# Patient Record
Sex: MALE | Race: WHITE | NOT HISPANIC OR LATINO | Employment: FULL TIME | URBAN - METROPOLITAN AREA
[De-identification: names, ages, dates, MRNs, and addresses within clinical notes are randomized per-mention and may not be internally consistent; named-entity substitution may affect disease eponyms.]

---

## 2021-02-10 PROBLEM — K51.90 ULCERATIVE COLITIS (HCC): Status: ACTIVE | Noted: 2021-02-10

## 2022-05-31 ENCOUNTER — APPOINTMENT (EMERGENCY)
Dept: RADIOLOGY | Facility: HOSPITAL | Age: 42
End: 2022-05-31
Payer: COMMERCIAL

## 2022-05-31 ENCOUNTER — HOSPITAL ENCOUNTER (EMERGENCY)
Facility: HOSPITAL | Age: 42
Discharge: HOME | End: 2022-05-31
Attending: EMERGENCY MEDICINE
Payer: COMMERCIAL

## 2022-05-31 VITALS
TEMPERATURE: 98.7 F | HEART RATE: 82 BPM | RESPIRATION RATE: 18 BRPM | BODY MASS INDEX: 35.72 KG/M2 | HEIGHT: 72 IN | OXYGEN SATURATION: 96 % | SYSTOLIC BLOOD PRESSURE: 129 MMHG | DIASTOLIC BLOOD PRESSURE: 75 MMHG

## 2022-05-31 DIAGNOSIS — U07.1 COVID-19: Primary | ICD-10-CM

## 2022-05-31 LAB
ALBUMIN SERPL BCP-MCNC: 4 G/DL (ref 3.5–5)
ALP SERPL-CCNC: 48 U/L (ref 34–104)
ALT SERPL W P-5'-P-CCNC: 46 U/L (ref 7–52)
ANION GAP SERPL CALCULATED.3IONS-SCNC: 6 MMOL/L (ref 4–13)
ANISOCYTOSIS BLD QL SMEAR: PRESENT
AST SERPL W P-5'-P-CCNC: 28 U/L (ref 13–39)
BASOPHILS # BLD MANUAL: 0 THOUSAND/UL (ref 0–0.1)
BASOPHILS NFR MAR MANUAL: 0 % (ref 0–1)
BILIRUB SERPL-MCNC: 0.26 MG/DL (ref 0.2–1)
BUN SERPL-MCNC: 13 MG/DL (ref 5–25)
CALCIUM SERPL-MCNC: 9.4 MG/DL (ref 8.4–10.2)
CHLORIDE SERPL-SCNC: 104 MMOL/L (ref 96–108)
CK SERPL-CCNC: 124 U/L (ref 39–308)
CO2 SERPL-SCNC: 25 MMOL/L (ref 21–32)
CREAT SERPL-MCNC: 0.86 MG/DL (ref 0.6–1.3)
EOSINOPHIL # BLD MANUAL: 0.16 THOUSAND/UL (ref 0–0.4)
EOSINOPHIL NFR BLD MANUAL: 2 % (ref 0–6)
ERYTHROCYTE [DISTWIDTH] IN BLOOD BY AUTOMATED COUNT: 13.3 % (ref 11.6–15.1)
FLUAV RNA RESP QL NAA+PROBE: NEGATIVE
FLUBV RNA RESP QL NAA+PROBE: NEGATIVE
GFR SERPL CREATININE-BSD FRML MDRD: 107 ML/MIN/1.73SQ M
GLUCOSE SERPL-MCNC: 109 MG/DL (ref 65–140)
HCT VFR BLD AUTO: 43 % (ref 36.5–49.3)
HGB BLD-MCNC: 14.7 G/DL (ref 12–17)
LYMPHOCYTES # BLD AUTO: 1.17 THOUSAND/UL (ref 0.6–4.47)
LYMPHOCYTES # BLD AUTO: 15 % (ref 14–44)
MAGNESIUM SERPL-MCNC: 1.9 MG/DL (ref 1.9–2.7)
MCH RBC QN AUTO: 29.4 PG (ref 26.8–34.3)
MCHC RBC AUTO-ENTMCNC: 34.2 G/DL (ref 31.4–37.4)
MCV RBC AUTO: 86 FL (ref 82–98)
MONOCYTES # BLD AUTO: 0.62 THOUSAND/UL (ref 0–1.22)
MONOCYTES NFR BLD: 8 % (ref 4–12)
NEUTROPHILS # BLD MANUAL: 5.83 THOUSAND/UL (ref 1.85–7.62)
NEUTS BAND NFR BLD MANUAL: 3 % (ref 0–8)
NEUTS SEG NFR BLD AUTO: 72 % (ref 43–75)
PLATELET # BLD AUTO: 217 THOUSANDS/UL (ref 149–390)
PLATELET BLD QL SMEAR: ADEQUATE
PMV BLD AUTO: 10.1 FL (ref 8.9–12.7)
POTASSIUM SERPL-SCNC: 4.1 MMOL/L (ref 3.5–5.3)
PROT SERPL-MCNC: 6.4 G/DL (ref 6.4–8.4)
RBC # BLD AUTO: 5 MILLION/UL (ref 3.88–5.62)
RSV RNA RESP QL NAA+PROBE: NEGATIVE
SARS-COV-2 RNA RESP QL NAA+PROBE: POSITIVE
SODIUM SERPL-SCNC: 135 MMOL/L (ref 135–147)
WBC # BLD AUTO: 7.77 THOUSAND/UL (ref 4.31–10.16)

## 2022-05-31 PROCEDURE — 99285 EMERGENCY DEPT VISIT HI MDM: CPT | Performed by: EMERGENCY MEDICINE

## 2022-05-31 PROCEDURE — 96361 HYDRATE IV INFUSION ADD-ON: CPT

## 2022-05-31 PROCEDURE — 0241U HB NFCT DS VIR RESP RNA 4 TRGT: CPT

## 2022-05-31 PROCEDURE — 86618 LYME DISEASE ANTIBODY: CPT

## 2022-05-31 PROCEDURE — 85027 COMPLETE CBC AUTOMATED: CPT

## 2022-05-31 PROCEDURE — 85007 BL SMEAR W/DIFF WBC COUNT: CPT

## 2022-05-31 PROCEDURE — 71045 X-RAY EXAM CHEST 1 VIEW: CPT

## 2022-05-31 PROCEDURE — 82550 ASSAY OF CK (CPK): CPT

## 2022-05-31 PROCEDURE — 80053 COMPREHEN METABOLIC PANEL: CPT

## 2022-05-31 PROCEDURE — 83735 ASSAY OF MAGNESIUM: CPT

## 2022-05-31 PROCEDURE — 99284 EMERGENCY DEPT VISIT MOD MDM: CPT

## 2022-05-31 PROCEDURE — 36415 COLL VENOUS BLD VENIPUNCTURE: CPT

## 2022-05-31 PROCEDURE — 96374 THER/PROPH/DIAG INJ IV PUSH: CPT

## 2022-05-31 RX ORDER — IBUPROFEN 400 MG/1
800 TABLET ORAL ONCE
Status: COMPLETED | OUTPATIENT
Start: 2022-05-31 | End: 2022-05-31

## 2022-05-31 RX ORDER — KETOROLAC TROMETHAMINE 30 MG/ML
15 INJECTION, SOLUTION INTRAMUSCULAR; INTRAVENOUS ONCE
Status: COMPLETED | OUTPATIENT
Start: 2022-05-31 | End: 2022-05-31

## 2022-05-31 RX ORDER — ACETAMINOPHEN 325 MG/1
975 TABLET ORAL ONCE
Status: COMPLETED | OUTPATIENT
Start: 2022-05-31 | End: 2022-05-31

## 2022-05-31 RX ADMIN — KETOROLAC TROMETHAMINE 15 MG: 30 INJECTION, SOLUTION INTRAMUSCULAR at 03:17

## 2022-05-31 RX ADMIN — IBUPROFEN 800 MG: 400 TABLET, FILM COATED ORAL at 01:53

## 2022-05-31 RX ADMIN — SODIUM CHLORIDE 1000 ML: 0.9 INJECTION, SOLUTION INTRAVENOUS at 01:59

## 2022-05-31 RX ADMIN — ACETAMINOPHEN 975 MG: 325 TABLET ORAL at 01:52

## 2022-05-31 NOTE — Clinical Note
accompanied Jessica Aparicio to the emergency department on 5/31/2022  Return date if applicable: 22/48/7261        If you have any questions or concerns, please don't hesitate to call        Ai Armendariz MD

## 2022-05-31 NOTE — Clinical Note
Marjan Hyman was seen and treated in our emergency department on 5/31/2022  Diagnosis:     Suzie Ngo  may return to work on return date  He may return on this date: 06/06/2022         If you have any questions or concerns, please don't hesitate to call        Irma Marquez MD    ______________________________           _______________          _______________  Hospital Representative                              Date                                Time

## 2022-05-31 NOTE — ED PROVIDER NOTES
History  Chief Complaint   Patient presents with    Flu Symptoms     Pt to ED with c/o cough, congestion, body aches, and "can't get comfortable"     Kiarra Vivar is a 39year old male with PMHx of HTN presenting to the ED due to flu-like symptoms  Patient states 1 week ago, he had congestion, fatigue  On 05/26, patient called his family doctor for an appointment, doc started him on z-pac which patient was initially helped  However, this morning patient's symptoms worsened, now has cough, congestion, orthostatic dizziness, fatigue, myalgias  Patient took Motrin and Tylenol which have not helped the symptoms  Vaccinated x 2 for covid, no booster  Not vaccinated for flu  Patient does note he recently came back from the [facility] on 5/24, denies rash or tick bite  Denies chest pain, shortness of breath, abdominal pain, urinary symptoms, n/v/d  Prior to Admission Medications   Prescriptions Last Dose Informant Patient Reported? Taking? amphetamine-dextroamphetamine (ADDERALL) 30 MG tablet   Yes No   Sig: Take 1 tablet by mouth daily   ezetimibe-simvastatin (VYTORIN) 10-20 mg per tablet   Yes No   Sig: Take 1 tablet by mouth daily at bedtime   mesalamine (LIALDA) 1 2 g EC tablet   No No   Sig: Take 4 tablets (4 8 g total) by mouth daily with breakfast   quinapril (ACCUPRIL) 20 mg tablet   Yes No   Sig: Take 20 mg by mouth daily      Facility-Administered Medications: None       Past Medical History:   Diagnosis Date    ADHD     Hyperlipidemia     Hypertension        History reviewed  No pertinent surgical history  Family History   Problem Relation Age of Onset    Lung cancer Maternal Grandmother     Melanoma Maternal Grandfather      I have reviewed and agree with the history as documented      E-Cigarette/Vaping     E-Cigarette/Vaping Substances     Social History     Tobacco Use    Smoking status: Current Every Day Smoker     Packs/day: 0 50     Types: Cigars    Smokeless tobacco: Never Used  Tobacco comment: once a week   Substance Use Topics    Alcohol use: Yes     Comment: very rare        Review of Systems   Constitutional: Positive for appetite change and fatigue  Negative for chills and fever  HENT: Positive for congestion  Negative for ear pain and sore throat  Eyes: Negative for pain and visual disturbance  Respiratory: Positive for cough  Negative for shortness of breath  Cardiovascular: Negative for chest pain and palpitations  Gastrointestinal: Negative for abdominal pain and vomiting  Genitourinary: Negative for dysuria and hematuria  Musculoskeletal: Positive for arthralgias and myalgias  Negative for back pain  Skin: Negative for color change and rash  Neurological: Positive for dizziness  Negative for seizures and syncope  All other systems reviewed and are negative  Physical Exam  ED Triage Vitals [05/31/22 0034]   Temperature Pulse Respirations Blood Pressure SpO2   98 7 °F (37 1 °C) 84 18 139/79 95 %      Temp Source Heart Rate Source Patient Position - Orthostatic VS BP Location FiO2 (%)   Oral -- -- -- --      Pain Score       9             Orthostatic Vital Signs  Vitals:    05/31/22 0034 05/31/22 0315   BP: 139/79 129/75   Pulse: 84 82       Physical Exam  Vitals and nursing note reviewed  Constitutional:       Appearance: He is well-developed  HENT:      Head: Normocephalic and atraumatic  Mouth/Throat:      Mouth: Mucous membranes are dry  Eyes:      Conjunctiva/sclera: Conjunctivae normal    Neck:      Vascular: No JVD  Trachea: Trachea and phonation normal    Cardiovascular:      Rate and Rhythm: Normal rate and regular rhythm  Pulses: Normal pulses  Radial pulses are 2+ on the right side and 2+ on the left side  Dorsalis pedis pulses are 2+ on the right side and 2+ on the left side  Heart sounds: Normal heart sounds  No murmur heard    Pulmonary:      Effort: Pulmonary effort is normal  No respiratory distress  Breath sounds: Normal breath sounds and air entry  Abdominal:      General: Bowel sounds are normal       Palpations: Abdomen is soft  Tenderness: There is no abdominal tenderness  Comments: Soft nontender, nonperitoneal   Musculoskeletal:      Cervical back: Full passive range of motion without pain, normal range of motion and neck supple  Right lower leg: No edema  Left lower leg: No edema  Skin:     General: Skin is warm and dry  Capillary Refill: Capillary refill takes less than 2 seconds  Neurological:      General: No focal deficit present  Mental Status: He is alert           ED Medications  Medications   sodium chloride 0 9 % bolus 1,000 mL (0 mL Intravenous Stopped 5/31/22 0317)   acetaminophen (TYLENOL) tablet 975 mg (975 mg Oral Given 5/31/22 0152)   ibuprofen (MOTRIN) tablet 800 mg (800 mg Oral Given 5/31/22 0153)   ketorolac (TORADOL) injection 15 mg (15 mg Intravenous Given 5/31/22 0317)       Diagnostic Studies  Results Reviewed     Procedure Component Value Units Date/Time    Manual Differential(PHLEBS Do Not Order) [[id number]] Collected: 05/31/22 0201    Lab Status: Final result Specimen: Blood from Arm, Right Updated: 05/31/22 0326     Segmented % 72 %      Bands % 3 %      Lymphocytes % 15 %      Monocytes % 8 %      Eosinophils, % 2 %      Basophils % 0 %      Absolute Neutrophils 5 83 Thousand/uL      Lymphocytes Absolute 1 17 Thousand/uL      Monocytes Absolute 0 62 Thousand/uL      Eosinophils Absolute 0 16 Thousand/uL      Basophils Absolute 0 00 Thousand/uL      Total Counted --     Anisocytosis Present     Platelet Estimate Adequate    CBC and differential [[id number]]  (Normal) Collected: 05/31/22 0201    Lab Status: Final result Specimen: Blood from Arm, Right Updated: 05/31/22 0326     WBC 7 77 Thousand/uL      RBC 5 00 Million/uL      Hemoglobin 14 7 g/dL      Hematocrit 43 0 %      MCV 86 fL      MCH 29 4 pg      MCHC 34 2 g/dL      RDW 13 3 %      MPV 10 1 fL      Platelets 047 Thousands/uL     Narrative: This is an appended report  These results have been appended to a previously verified report  COVID/FLU/RSV - 2 hour TAT [[id number]]  (Abnormal) Collected: 05/31/22 0204    Lab Status: Final result Specimen: Nares from Nose Updated: 05/31/22 0249     SARS-CoV-2 Positive     INFLUENZA A PCR Negative     INFLUENZA B PCR Negative     RSV PCR Negative    Narrative:      FOR PEDIATRIC PATIENTS - copy/paste COVID Guidelines URL to browser: [email or web address]  [employer]    SARS-CoV-2 assay is a Nucleic Acid Amplification assay intended for the  qualitative detection of nucleic acid from SARS-CoV-2 in nasopharyngeal  swabs  Results are for the presumptive identification of SARS-CoV-2 RNA  Positive results are indicative of infection with SARS-CoV-2, the virus  causing COVID-19, but do not rule out bacterial infection or co-infection  with other viruses  Laboratories within the [place] and its  territories are required to report all positive results to the appropriate  public health authorities  Negative results do not preclude SARS-CoV-2  infection and should not be used as the sole basis for treatment or other  patient management decisions  Negative results must be combined with  clinical observations, patient history, and epidemiological information  This test has not been FDA cleared or approved  This test has been authorized by FDA under an Emergency Use Authorization  (EUA)  This test is only authorized for the duration of time the  declaration that circumstances exist justifying the authorization of the  emergency use of an in vitro diagnostic tests for detection of SARS-CoV-2  virus and/or diagnosis of COVID-19 infection under section 564(b)(1) of  the Act, 21 U  S C  097IXV-0(F)(0), unless the authorization is terminated  or revoked sooner   The test has been validated but independent review by FDA  and CLIA is pending  Test performed using [employer] GeneXpert: This RT-PCR assay targets N2,  a region unique to SARS-CoV-2  A conserved region in the E-gene was chosen  for pan-Sarbecovirus detection which includes SARS-CoV-2  Comprehensive metabolic panel [[id number]] Collected: 05/31/22 0201    Lab Status: Final result Specimen: Blood from Arm, Right Updated: 05/31/22 0233     Sodium 135 mmol/L      Potassium 4 1 mmol/L      Chloride 104 mmol/L      CO2 25 mmol/L      ANION GAP 6 mmol/L      BUN 13 mg/dL      Creatinine 0 86 mg/dL      Glucose 109 mg/dL      Calcium 9 4 mg/dL      AST 28 U/L      ALT 46 U/L      Alkaline Phosphatase 48 U/L      Total Protein 6 4 g/dL      Albumin 4 0 g/dL      Total Bilirubin 0 26 mg/dL      eGFR 107 ml/min/1 73sq m     Narrative:      [place] guidelines for Chronic Kidney Disease (CKD):     Stage 1 with normal or high GFR (GFR > 90 mL/min/1 73 square meters)    Stage 2 Mild CKD (GFR = 60-89 mL/min/1 73 square meters)    Stage 3A Moderate CKD (GFR = 45-59 mL/min/1 73 square meters)    Stage 3B Moderate CKD (GFR = 30-44 mL/min/1 73 square meters)    Stage 4 Severe CKD (GFR = 15-29 mL/min/1 73 square meters)    Stage 5 End Stage CKD (GFR <15 mL/min/1 73 square meters)  Note: GFR calculation is accurate only with a steady state creatinine    Magnesium [[id number]]  (Normal) Collected: 05/31/22 0201    Lab Status: Final result Specimen: Blood from Arm, Right Updated: 05/31/22 0232     Magnesium 1 9 mg/dL     CK Total with Reflex CKMB [[id number]]  (Normal) Collected: 05/31/22 0201    Lab Status: Final result Specimen: Blood from Arm, Right Updated: 05/31/22 0232     Total  U/L     Lyme Antibody Profile with reflex to [facility] [[id number]] Collected: 05/31/22 0201    Lab Status:  In process Specimen: Blood from Arm, Right Updated: 05/31/22 0205                 XR chest portable   ED Interpretation by Harry Lockett MD (05/31 0302)   Poor inspiratory effort  Normal cardiac silhouette  No pleural effusions, no infiltrations, no pneumothorax, no free or subcutaneous air  No priors to compare to            Procedures  Procedures      ED Course  ED Course as of 05/31/22 0517   Tue May 31, 2022   0241 Pt re-eval; states he still has joint pain   0250 SARS-COV-2(!): Positive  Pt not a candidate for outpatient covid treatments as he is outside of the time range   0313 Patient updated about all labs and imaging  MDM  Number of Diagnoses or Management Options  COVID-19  Diagnosis management comments: 31-year-old male with cough, congestion, orthostatic dizziness, fatigue, myalgias, arthralgias x1 week  COVID (+), other labs unremarkable  Patient not a candidate for outpatient COVID treatment at this time as he did not to the ED within 5 days of symptom onset  Patient given strict return precautions and isolation instructions  He verbalized understanding  Pending Lyme antibody S patient recently came back from the [facility] and was having severe myalgias and arthralgias         Amount and/or Complexity of Data Reviewed  Clinical lab tests: ordered and reviewed  Tests in the radiology section of CPT®: ordered and reviewed  Tests in the medicine section of CPT®: ordered and reviewed  Decide to obtain previous medical records or to obtain history from someone other than the patient: yes  Review and summarize past medical records: yes  Independent visualization of images, tracings, or specimens: yes    Risk of Complications, Morbidity, and/or Mortality  Presenting problems: moderate  Diagnostic procedures: moderate  Management options: moderate    Patient Progress  Patient progress: stable      Disposition  Final diagnoses:   COVID-19     Time reflects when diagnosis was documented in both MDM as applicable and the Disposition within this note     Time User Action Codes Description Comment    5/31/2022 3:13 AM Katerin Silence Add [U07 1] [id number]       ED Disposition     ED Disposition   Discharge    Condition   Stable    Date/Time   Tue May 31, 2022  3:19 AM    Comment   Destiny Nayla discharge to home/self care  Follow-up Information     Follow up With Specialties Details Why Contact Harsh Powers MD Internal Medicine Call  Follow-up with PCP in 1 week  [id number]  [id number]  [phone]            Discharge Medication List as of 5/31/2022  3:19 AM      CONTINUE these medications which have NOT CHANGED    Details   amphetamine-dextroamphetamine (ADDERALL) 30 MG tablet Take 1 tablet by mouth daily, Starting Thu 1/28/2021, Historical Med      ezetimibe-simvastatin (VYTORIN) 10-20 mg per tablet Take 1 tablet by mouth daily at bedtime, Starting Sat 1/2/2021, Historical Med      mesalamine (LIALDA) 1 2 g EC tablet Take 4 tablets (4 8 g total) by mouth daily with breakfast, Starting Fri 1/8/2021, Until Sun 2/7/2021, Normal      quinapril (ACCUPRIL) 20 mg tablet Take 20 mg by mouth daily, Starting Wed 1/27/2021, Historical Med           No discharge procedures on file  PDMP Review     None           ED Provider  Attending physically available and evaluated Destiny Winslow  CARMEN managed the patient along with the ED Attending      Electronically Signed by         Radha Grajeda MD  05/31/22 3099

## 2022-05-31 NOTE — DISCHARGE INSTRUCTIONS
Stay at home for 5 days and isolate from others in your home  Wear a well fitting mask if you must be around others in her home  Do not travel  Can stop isolation after 5 full days if you are fever free for 24 hours without the use of fever reducing medications  Results of your lyme test [facility] when results  Take Tylenol and Motrin for muscle aches and pains  Stay hydrated  Return to ED if any worsening symptoms

## 2022-05-31 NOTE — ED ATTENDING ATTESTATION
5/31/2022  I, Ignacia Barr MD, saw and evaluated the patient  I have discussed the patient with the resident/non-physician practitioner and agree with the resident's/non-physician practitioner's findings, Plan of Care, and MDM as documented in the resident's/non-physician practitioner's note, except where noted  All available labs and Radiology studies were reviewed  I was present for key portions of any procedure(s) performed by the resident/non-physician practitioner and I was immediately available to provide assistance  At this point I agree with the current assessment done in the Emergency Department  I have conducted an independent evaluation of this patient a history and physical is as follows:    ED Course         Critical Care Time  Procedures    Patient is a pleasant well appearing 39 yom who presents with congestion, fatigue  Recently started a zpak by his pcp  He is bothered by his myalgias  Otherwise welling well  MDM pleasant 39 yom, will treat symptoms

## 2022-06-01 LAB — B BURGDOR IGG+IGM SER-ACNC: 8
